# Patient Record
(demographics unavailable — no encounter records)

---

## 2024-10-25 NOTE — DEVELOPMENTAL MILESTONES
[Spreads with a knife] : spreads with a knife [Dresses and undresses without help] : dresses and undresses without help [Goes to the bathroom independently] : goes to bathroom independently [Is dry through the day] :  is dry through the day [Plays and interacts with peer] : plays and interacts with peer [Tells a story of 2 sentences or more] : tells a story of 2 sentences or more [Counts 5 objects] : counts 5 objects [Names 3 or more numbers] : names 3 or more numbers [Names 4 or more letters out of order] : names 4 or more letters out of order [Is beginning to skip] : is beginning to skip [Walks on tiptoes when asked] : walks on tiptoes when asked [Catches a bounced ball with] : catches a bounced ball with 2 hands [Copies a triangle] : copies a triangle [Copies first name] : copies first name [Cuts well with scissors] : cuts well with scissors [Writes 2 or more letters] : writes 2 or more letters [Normal Development] : Normal Development [Yes: _______] : yes, [unfilled]

## 2024-10-25 NOTE — HISTORY OF PRESENT ILLNESS
[Parents] : parents [In own bed] : In own bed [Brushing teeth] : Brushing teeth [Yes] : Patient goes to dentist yearly [Appropiate parent-child-sibling interaction] : Appropriate parent-child-sibling interaction [Child Cooperates] : Child cooperates [Parent has appropriate responses to behavior] : Parent has appropriate responses to behavior [In ] : In  [Adequate performance] : Adequate performance [Adequate attention] : Adequate attention [No difficulties with Homework] : No difficulties with homework  [No] : No cigarette smoke exposure [Car seat in back seat] : Car seat in back seat [Influenza] : Influenza [de-identified] : Good eater [FreeTextEntry8] : Hard BM but goes every day. [FreeTextEntry3] : all night [de-identified] : 2 x per day [FreeTextEntry1] : Parents will return with Cody for the flu vaccine due to her having a little cold today

## 2024-10-26 NOTE — PHYSICAL EXAM
[Erythematous Oropharynx] : erythematous oropharynx [NL] : regular rate and rhythm, normal S1, S2 audible, no murmurs [FreeTextEntry1] : Croupy cough [FreeTextEntry5] : Red area medial lash line left lower eyelid

## 2024-10-26 NOTE — HISTORY OF PRESENT ILLNESS
[FreeTextEntry6] : -barky cough worse at night, pt had 102.9-103 fever. congestion  last dose of Motrin at midnight, mom mentions pt has stye on her eye getting worse  100.4 fever today %

## 2024-11-09 NOTE — PHYSICAL EXAM
[NL] : regular rate and rhythm, normal S1, S2 audible, no murmurs [FreeTextEntry5] : Right lower eyelid with erythema medially and mid lash line.  Sclera noninjected

## 2024-11-09 NOTE — DISCUSSION/SUMMARY
[FreeTextEntry1] : Recommend starting warm soaks.  Will prescribe second antibiotic drops.  Advised mom that if it is not improving by the end of next week to let me know and I will refer her to ophthalmology for further evaluation

## 2024-11-09 NOTE — HISTORY OF PRESENT ILLNESS
[FreeTextEntry6] : pt stye not going away, mom says it looks like another stye is growing on top  pt dog scratched around area doesn't know if that could be why.  no itchiness or pain  Completed full course of eyedrops without any significant relief.  Cody not tolerating warm soaks